# Patient Record
(demographics unavailable — no encounter records)

---

## 2025-01-22 NOTE — ASSESSMENT
[FreeTextEntry1] : Right X-Ray Examination of the KNEE (4 views): there are no fractures, subluxations or dislocations.   knee internal derangement likely acl tear requiring surgery   Due to the patients acute complicated injury with instability along with pain, effusion, and pos lachman test on exam we will get an mri to eval for ACL tear   - We discussed their diagnosis and treatment options at length including the risks and benefits of both surgical and non-surgical options. Surgical risks include but are not limited to pain, infection, bleeding, vascular injury, numbness, tingling, nerve damage. - The patient was advised to apply ice (wrapped in a towel or protective covering) to the area daily (20 minutes at a time, 2-4X/day). - Hinged Knee brace in order to minimize buckling and instability - asp R knee, gissell well - naprosyn rx - Patient was given a prescription for an anti-inflammatory medication.  They will take it for the next week and then on an as needed basis, as long as there are no medical contra-indications.  Patient is counseled on possible GI, renal, and cardiovascular side effects. - The patient was advised to modify their activities. - f/u after mri to further discuss surgery

## 2025-01-22 NOTE — HISTORY OF PRESENT ILLNESS
[de-identified] : 21 year old female  ([place]  )   Rght knee pain since 1/21/25 while snowboarding and fell on knee and treuoble ambulating since The pain is located  anterior and medial The pain is associated with  swelling, buckling Worse with activity and better at rest. Has tried icing, Tylenol, activiyt mod

## 2025-01-22 NOTE — PROCEDURE
[FreeTextEntry3] : Aspiration Procedure Note:  The risks, benefits, and alternatives to aspiration were reviewed with the patient.  Risks outlined include but are not limited to infection, sepsis, bleeding, temporary increase in pain, syncopal episode, failure to resolve symptoms, and symptoms recurrence. Patient understood the risks and asked to proceed with this treatment course.  Patient Identification Name/: Verbal with patient and/or family  Procedure Verification: Procedure confirmed with patient or family/designee Consent for procedure: Verbal Consent Given Relevant documentation completed, reviewed, and signed Clinical indications for procedure confirmed  Time-out with all members of procedure team immediately prior to procedure: Correct patient identified. Agreement on procedure. Correct side and site.  KNEE ASPIRATION - RIGHT After verbal consent and identification of the correct patient and correct site, the superolateral right knee was prepped using alcohol swabs and betadine. This was allowed time to air dry.  70 cc of bloody fluid was aspirated from the knee using a sterile 18G needle after ethyl chloride spray for skin anesthesia. The patient tolerated the procedure well. After-care instructions were provided and included instructions to ice the area and to call if redness, pain, or fever develop.

## 2025-01-22 NOTE — IMAGING
[de-identified] :  RIGHT KNEE Inspection: moderate effusion Palpation: medial and lateral joint line tenderness Range of Motion: 3-125 degrees. Strength: Quadriceps strength is 4+/5 Hamstring strength is 5/5 Neurological: light touch is intact throughout  Ligament Stability and Special Tests:  McMurrays: positive Lachman: positive Pivot Shift: positive Posterior Drawer: neg Valgus: neg Varus: neg Patella Apprehension: neg Patella Maltracking: neg

## 2025-01-27 NOTE — IMAGING
[de-identified] :  RIGHT  KNEE Inspection:  mild  effusion Palpation: med facet of patella tenderness, medial retinacular tenderness Knee Range of Motion:  0-120 Strength: 5/5 Quadriceps strength, 5/5 Hamstring strength Neurological: light touch is intact throughout Ligament Stability and Special Tests:  McMurrays: neg Lachman: neg Pivot Shift: neg Posterior Drawer: neg Valgus: neg Varus: neg Patella Apprehension: positive Patella Maltracking: positive

## 2025-01-27 NOTE — HISTORY OF PRESENT ILLNESS
[de-identified] : 21 year old female  ([place]  )   Rght knee pain since 1/21/25 while snowboarding and fell on knee and felt pop treuoble ambulating since The pain is located  anterior and medial The pain is associated with  swelling, buckling Worse with activity and better at rest. Has tried icing, Tylenol, activiyt mod  1/27/25 - asp knee last visit, icing, using brace, had mri

## 2025-01-27 NOTE — ASSESSMENT
[FreeTextEntry1] :  mri right knee 1/22/25 - evid lat patella dislocation event with BB, MPFL sprain, TT-TG 12mm, eff, focal chondral defect 5mm periph LFC, no  obvious LB   - We discussed their diagnosis and treatment options at length including the risks and benefits of both surgical and non-surgical options. Surgical risks include but are not limited to pain, infection, bleeding, vascular injury, numbness, tingling, nerve damage. - discussed surgery along with r/b/a MPFLR at length - Due to risks of surgery, they will continue conservative treatment with PT, icing, and anti-inflammatory medications. - PT for quad/VMO strengthening and progress to dynamic core and glut exercises - Patella stablization brace - We discussed that RTP can happen when full ROM, no effusion, no pain, full strength (this may take up to 3-4 months after the instability episode) - If they do not make an appropriate improvement with conservative treatment we discussed the possibility of surgical intervention in the future. - Follow up in 6 weeks to re-evaluate.

## 2025-03-10 NOTE — HISTORY OF PRESENT ILLNESS
[de-identified] : 21 year old female  ([place]  )   Rght knee pain since 1/21/25 while snowboarding and fell on knee and felt pop treuoble ambulating since The pain is located  anterior and medial The pain is associated with  swelling, buckling Worse with activity and better at rest. Has tried icing, Tylenol, activiyt mod  1/27/25 - asp knee last visit, icing, using brace, had mri 3/10/25- doing PT at [facility] in [place], pain improving , doing HEP

## 2025-03-10 NOTE — IMAGING
[de-identified] :  RIGHT KNEE Inspection:  minimal effusion Palpation: medial facet of the patella tenderness  Knee Range of Motion:  0-135 Strength: 5/5 Quadriceps strength, 5/5 Hamstring strength, 4/5 Hip Abductor strength Neurological: light touch is intact throughout Ligament Stability and Special Tests:  McMurrays: neg Lachman: neg Pivot Shift: neg Posterior Drawer: neg Valgus: neg Varus: neg Patella Apprehension: neg Patella Maltracking: neg

## 2025-03-10 NOTE — ASSESSMENT
[FreeTextEntry1] : mri right knee 1/22/25 - evid lat patella dislocation event with BB, MPFL sprain, TT-TG 12mm, eff, focal chondral defect 5mm periph LFC, no  obvious LB   - We discussed their diagnosis and treatment options at length including the risks and benefits of both surgical and non-surgical options. Surgical risks include but are not limited to pain, infection, bleeding, vascular injury, numbness, tingling, nerve damage. - discussed surgery along with r/b/a MPFLR at length - Due to risks of surgery, they will continue conservative treatment with PT, icing, and anti-inflammatory medications. - PT for quad/VMO strengthening and progress to dynamic core and glut exercises - If they do not make an appropriate improvement with conservative treatment we discussed the possibility of surgical intervention in the future. - Follow up in 6 weeks to re-evaluate.